# Patient Record
Sex: FEMALE | Race: WHITE | NOT HISPANIC OR LATINO | URBAN - METROPOLITAN AREA
[De-identification: names, ages, dates, MRNs, and addresses within clinical notes are randomized per-mention and may not be internally consistent; named-entity substitution may affect disease eponyms.]

---

## 2020-11-03 ENCOUNTER — OFFICE (OUTPATIENT)
Dept: URBAN - METROPOLITAN AREA CLINIC 9 | Facility: CLINIC | Age: 55
End: 2020-11-03

## 2020-11-03 VITALS
SYSTOLIC BLOOD PRESSURE: 145 MMHG | HEIGHT: 64 IN | HEART RATE: 53 BPM | DIASTOLIC BLOOD PRESSURE: 75 MMHG | TEMPERATURE: 97.5 F | WEIGHT: 199 LBS | OXYGEN SATURATION: 95 %

## 2020-11-03 DIAGNOSIS — R13.10 DYSPHAGIA, UNSPECIFIED: ICD-10-CM

## 2020-11-03 DIAGNOSIS — R10.11 RIGHT UPPER QUADRANT PAIN: ICD-10-CM

## 2020-11-03 PROCEDURE — 99204 OFFICE O/P NEW MOD 45 MIN: CPT | Performed by: INTERNAL MEDICINE

## 2020-11-03 RX ORDER — PANTOPRAZOLE SODIUM 40 MG/1
40 TABLET, DELAYED RELEASE ORAL
Qty: 30 | Refills: 11 | Status: COMPLETED
Start: 2020-11-03 | End: 2023-08-28

## 2020-11-12 ENCOUNTER — AMBULATORY SURGICAL CENTER (OUTPATIENT)
Dept: URBAN - METROPOLITAN AREA SURGERY 2 | Facility: SURGERY | Age: 55
End: 2020-11-12

## 2020-11-12 ENCOUNTER — AMBULATORY SURGICAL CENTER (OUTPATIENT)
Dept: URBAN - METROPOLITAN AREA SURGERY 2 | Facility: SURGERY | Age: 55
End: 2020-11-12
Payer: COMMERCIAL

## 2020-11-12 ENCOUNTER — OFFICE (OUTPATIENT)
Dept: URBAN - METROPOLITAN AREA PATHOLOGY 22 | Facility: PATHOLOGY | Age: 55
End: 2020-11-12
Payer: COMMERCIAL

## 2020-11-12 VITALS
HEIGHT: 64 IN | DIASTOLIC BLOOD PRESSURE: 72 MMHG | DIASTOLIC BLOOD PRESSURE: 63 MMHG | HEART RATE: 72 BPM | SYSTOLIC BLOOD PRESSURE: 166 MMHG | DIASTOLIC BLOOD PRESSURE: 65 MMHG | SYSTOLIC BLOOD PRESSURE: 166 MMHG | WEIGHT: 199 LBS | HEIGHT: 64 IN | HEART RATE: 73 BPM | TEMPERATURE: 97.6 F | OXYGEN SATURATION: 100 % | DIASTOLIC BLOOD PRESSURE: 72 MMHG | HEART RATE: 72 BPM | HEART RATE: 62 BPM | SYSTOLIC BLOOD PRESSURE: 139 MMHG | SYSTOLIC BLOOD PRESSURE: 124 MMHG | OXYGEN SATURATION: 98 % | HEART RATE: 51 BPM | DIASTOLIC BLOOD PRESSURE: 71 MMHG | SYSTOLIC BLOOD PRESSURE: 139 MMHG | DIASTOLIC BLOOD PRESSURE: 68 MMHG | OXYGEN SATURATION: 96 % | RESPIRATION RATE: 17 BRPM | TEMPERATURE: 975 F | HEART RATE: 73 BPM | TEMPERATURE: 975 F | HEART RATE: 73 BPM | DIASTOLIC BLOOD PRESSURE: 63 MMHG | SYSTOLIC BLOOD PRESSURE: 124 MMHG | OXYGEN SATURATION: 100 % | SYSTOLIC BLOOD PRESSURE: 138 MMHG | HEART RATE: 72 BPM | RESPIRATION RATE: 16 BRPM | HEIGHT: 64 IN | DIASTOLIC BLOOD PRESSURE: 65 MMHG | SYSTOLIC BLOOD PRESSURE: 124 MMHG | HEART RATE: 70 BPM | SYSTOLIC BLOOD PRESSURE: 138 MMHG | DIASTOLIC BLOOD PRESSURE: 68 MMHG | DIASTOLIC BLOOD PRESSURE: 65 MMHG | TEMPERATURE: 975 F | SYSTOLIC BLOOD PRESSURE: 160 MMHG | SYSTOLIC BLOOD PRESSURE: 160 MMHG | HEART RATE: 62 BPM | HEART RATE: 62 BPM | TEMPERATURE: 97.6 F | TEMPERATURE: 97.6 F | OXYGEN SATURATION: 100 % | SYSTOLIC BLOOD PRESSURE: 166 MMHG | HEART RATE: 51 BPM | WEIGHT: 199 LBS | DIASTOLIC BLOOD PRESSURE: 68 MMHG | HEART RATE: 70 BPM | OXYGEN SATURATION: 96 % | SYSTOLIC BLOOD PRESSURE: 138 MMHG | OXYGEN SATURATION: 98 % | RESPIRATION RATE: 16 BRPM | SYSTOLIC BLOOD PRESSURE: 139 MMHG | WEIGHT: 199 LBS | RESPIRATION RATE: 17 BRPM | HEART RATE: 51 BPM | OXYGEN SATURATION: 96 % | RESPIRATION RATE: 16 BRPM | DIASTOLIC BLOOD PRESSURE: 63 MMHG | OXYGEN SATURATION: 98 % | SYSTOLIC BLOOD PRESSURE: 160 MMHG | DIASTOLIC BLOOD PRESSURE: 72 MMHG | DIASTOLIC BLOOD PRESSURE: 71 MMHG | DIASTOLIC BLOOD PRESSURE: 71 MMHG | RESPIRATION RATE: 17 BRPM | HEART RATE: 70 BPM

## 2020-11-12 DIAGNOSIS — K22.2 ESOPHAGEAL OBSTRUCTION: ICD-10-CM

## 2020-11-12 DIAGNOSIS — R13.10 DYSPHAGIA, UNSPECIFIED: ICD-10-CM

## 2020-11-12 DIAGNOSIS — K31.89 OTHER DISEASES OF STOMACH AND DUODENUM: ICD-10-CM

## 2020-11-12 DIAGNOSIS — R10.11 RIGHT UPPER QUADRANT PAIN: ICD-10-CM

## 2020-11-12 DIAGNOSIS — K44.9 DIAPHRAGMATIC HERNIA WITHOUT OBSTRUCTION OR GANGRENE: ICD-10-CM

## 2020-11-12 PROCEDURE — 43239 EGD BIOPSY SINGLE/MULTIPLE: CPT | Mod: 59 | Performed by: INTERNAL MEDICINE

## 2020-11-12 PROCEDURE — 88342 IMHCHEM/IMCYTCHM 1ST ANTB: CPT | Performed by: INTERNAL MEDICINE

## 2020-11-12 PROCEDURE — 88305 TISSUE EXAM BY PATHOLOGIST: CPT | Performed by: INTERNAL MEDICINE

## 2020-11-12 PROCEDURE — 88313 SPECIAL STAINS GROUP 2: CPT | Performed by: INTERNAL MEDICINE

## 2020-11-12 PROCEDURE — 43248 EGD GUIDE WIRE INSERTION: CPT | Performed by: INTERNAL MEDICINE

## 2023-08-28 ENCOUNTER — OFFICE (OUTPATIENT)
Dept: URBAN - METROPOLITAN AREA CLINIC 19 | Facility: CLINIC | Age: 58
End: 2023-08-28

## 2023-08-28 VITALS
OXYGEN SATURATION: 96 % | SYSTOLIC BLOOD PRESSURE: 163 MMHG | DIASTOLIC BLOOD PRESSURE: 81 MMHG | HEIGHT: 64 IN | WEIGHT: 183 LBS | HEART RATE: 74 BPM

## 2023-08-28 DIAGNOSIS — R11.10 VOMITING, UNSPECIFIED: ICD-10-CM

## 2023-08-28 DIAGNOSIS — R68.81 EARLY SATIETY: ICD-10-CM

## 2023-08-28 DIAGNOSIS — R10.13 EPIGASTRIC PAIN: ICD-10-CM

## 2023-08-28 DIAGNOSIS — R14.0 ABDOMINAL DISTENSION (GASEOUS): ICD-10-CM

## 2023-08-28 PROCEDURE — 99214 OFFICE O/P EST MOD 30 MIN: CPT | Performed by: NURSE PRACTITIONER

## 2023-08-28 NOTE — SERVICEHPINOTES
Ms. Koehler is a 58-year-old female with PMH including diverticulosis, IH stage 3, esophageal HH, esophageal ring, gastritis,  HLD, HTN, Hashimoto's.
br
br She was previously followed by Dr. Self. She presents to clinic today with c/o RUQ AP, epigastric discomfort.
lexii Sky had the onset of right upper quadrant abdominal pain around November of 2022.  States it comes on suddenly after eating, pressure in the epigastric region/right upper quadrant with some radiation to her right ribcage. Describes as sharp, squeezing. She is working with a nutritionist see if this will help some.  States after she got COVID in January of 2023, became more frequent.
lexii Sky has a 20 lbs. intentional weight loss, vomiting about twice a month, early satiety, phlegm production, bloating.
br
lexiiNo anorexia, diarrhea, constipation, melena, hematochezia, hematemesis, coffee-ground emesis, nausea, dysphagia, odynophagia, chest pain, shortness of breath, rectal pain.

## 2023-08-29 LAB
HEPATIC FUNCTION PANEL (7): ALBUMIN: 4.3 G/DL (ref 3.8–4.9)
HEPATIC FUNCTION PANEL (7): ALKALINE PHOSPHATASE: 94 IU/L (ref 44–121)
HEPATIC FUNCTION PANEL (7): ALT (SGPT): 15 IU/L (ref 0–32)
HEPATIC FUNCTION PANEL (7): AST (SGOT): 16 IU/L (ref 0–40)
HEPATIC FUNCTION PANEL (7): BILIRUBIN, DIRECT: <0.1 MG/DL
HEPATIC FUNCTION PANEL (7): BILIRUBIN, TOTAL: <0.2 MG/DL
HEPATIC FUNCTION PANEL (7): PROTEIN, TOTAL: 6.9 G/DL (ref 6–8.5)
LIPASE: 29 U/L (ref 14–72)

## 2023-09-28 ENCOUNTER — AMBULATORY SURGICAL CENTER (OUTPATIENT)
Dept: URBAN - METROPOLITAN AREA SURGERY 2 | Facility: SURGERY | Age: 58
End: 2023-09-28

## 2023-09-28 ENCOUNTER — AMBULATORY SURGICAL CENTER (OUTPATIENT)
Dept: URBAN - METROPOLITAN AREA SURGERY 2 | Facility: SURGERY | Age: 58
End: 2023-09-28
Payer: COMMERCIAL

## 2023-09-28 VITALS
SYSTOLIC BLOOD PRESSURE: 103 MMHG | OXYGEN SATURATION: 98 % | OXYGEN SATURATION: 92 % | HEART RATE: 78 BPM | DIASTOLIC BLOOD PRESSURE: 67 MMHG | TEMPERATURE: 98 F | HEIGHT: 64 IN | DIASTOLIC BLOOD PRESSURE: 75 MMHG | WEIGHT: 183 LBS | DIASTOLIC BLOOD PRESSURE: 80 MMHG | SYSTOLIC BLOOD PRESSURE: 166 MMHG | OXYGEN SATURATION: 94 % | SYSTOLIC BLOOD PRESSURE: 117 MMHG | DIASTOLIC BLOOD PRESSURE: 58 MMHG | HEIGHT: 64 IN | DIASTOLIC BLOOD PRESSURE: 80 MMHG | SYSTOLIC BLOOD PRESSURE: 117 MMHG | DIASTOLIC BLOOD PRESSURE: 80 MMHG | SYSTOLIC BLOOD PRESSURE: 128 MMHG | SYSTOLIC BLOOD PRESSURE: 128 MMHG | SYSTOLIC BLOOD PRESSURE: 103 MMHG | SYSTOLIC BLOOD PRESSURE: 123 MMHG | RESPIRATION RATE: 18 BRPM | HEART RATE: 80 BPM | WEIGHT: 183 LBS | DIASTOLIC BLOOD PRESSURE: 67 MMHG | OXYGEN SATURATION: 98 % | RESPIRATION RATE: 18 BRPM | SYSTOLIC BLOOD PRESSURE: 103 MMHG | OXYGEN SATURATION: 96 % | SYSTOLIC BLOOD PRESSURE: 166 MMHG | HEART RATE: 67 BPM | SYSTOLIC BLOOD PRESSURE: 128 MMHG | HEART RATE: 69 BPM | DIASTOLIC BLOOD PRESSURE: 50 MMHG | HEART RATE: 69 BPM | OXYGEN SATURATION: 96 % | DIASTOLIC BLOOD PRESSURE: 75 MMHG | TEMPERATURE: 98 F | HEART RATE: 67 BPM | HEART RATE: 80 BPM | DIASTOLIC BLOOD PRESSURE: 67 MMHG | DIASTOLIC BLOOD PRESSURE: 58 MMHG | DIASTOLIC BLOOD PRESSURE: 50 MMHG | HEART RATE: 78 BPM | DIASTOLIC BLOOD PRESSURE: 58 MMHG | SYSTOLIC BLOOD PRESSURE: 166 MMHG | OXYGEN SATURATION: 94 % | OXYGEN SATURATION: 98 % | HEART RATE: 80 BPM | OXYGEN SATURATION: 92 % | SYSTOLIC BLOOD PRESSURE: 117 MMHG | OXYGEN SATURATION: 92 % | HEART RATE: 78 BPM | SYSTOLIC BLOOD PRESSURE: 123 MMHG | HEART RATE: 69 BPM | WEIGHT: 183 LBS | TEMPERATURE: 98 F | OXYGEN SATURATION: 94 % | RESPIRATION RATE: 18 BRPM | HEART RATE: 67 BPM | OXYGEN SATURATION: 96 % | DIASTOLIC BLOOD PRESSURE: 50 MMHG | SYSTOLIC BLOOD PRESSURE: 123 MMHG | HEIGHT: 64 IN | DIASTOLIC BLOOD PRESSURE: 75 MMHG

## 2023-09-28 DIAGNOSIS — K44.9 DIAPHRAGMATIC HERNIA WITHOUT OBSTRUCTION OR GANGRENE: ICD-10-CM

## 2023-09-28 DIAGNOSIS — R11.10 VOMITING, UNSPECIFIED: ICD-10-CM

## 2023-09-28 DIAGNOSIS — R10.13 EPIGASTRIC PAIN: ICD-10-CM

## 2023-09-28 PROCEDURE — 43235 EGD DIAGNOSTIC BRUSH WASH: CPT | Performed by: INTERNAL MEDICINE

## 2023-09-28 RX ADMIN — PROPOFOL 130 MG: 10 INJECTION, EMULSION INTRAVENOUS at 15:05

## 2024-04-19 ENCOUNTER — OFFICE (OUTPATIENT)
Dept: URBAN - METROPOLITAN AREA CLINIC 19 | Facility: CLINIC | Age: 59
End: 2024-04-19

## 2024-04-19 VITALS
HEIGHT: 64 IN | HEART RATE: 58 BPM | SYSTOLIC BLOOD PRESSURE: 153 MMHG | DIASTOLIC BLOOD PRESSURE: 72 MMHG | OXYGEN SATURATION: 98 % | WEIGHT: 176 LBS

## 2024-04-19 DIAGNOSIS — K44.9 DIAPHRAGMATIC HERNIA WITHOUT OBSTRUCTION OR GANGRENE: ICD-10-CM

## 2024-04-19 DIAGNOSIS — Z98.890 OTHER SPECIFIED POSTPROCEDURAL STATES: ICD-10-CM

## 2024-04-19 PROCEDURE — 99214 OFFICE O/P EST MOD 30 MIN: CPT | Performed by: INTERNAL MEDICINE

## 2025-01-02 ENCOUNTER — OFFICE (OUTPATIENT)
Dept: URBAN - METROPOLITAN AREA PATHOLOGY 12 | Facility: PATHOLOGY | Age: 60
End: 2025-01-02
Payer: COMMERCIAL

## 2025-01-02 DIAGNOSIS — Z12.11 ENCOUNTER FOR SCREENING FOR MALIGNANT NEOPLASM OF COLON: ICD-10-CM

## 2025-01-02 DIAGNOSIS — D12.5 BENIGN NEOPLASM OF SIGMOID COLON: ICD-10-CM

## 2025-01-02 PROBLEM — K63.5 POLYP OF COLON: Status: ACTIVE | Noted: 2025-01-02

## 2025-01-02 PROCEDURE — 88305 TISSUE EXAM BY PATHOLOGIST: CPT | Performed by: PATHOLOGY
